# Patient Record
Sex: FEMALE | Race: WHITE | NOT HISPANIC OR LATINO | Employment: FULL TIME | URBAN - METROPOLITAN AREA
[De-identification: names, ages, dates, MRNs, and addresses within clinical notes are randomized per-mention and may not be internally consistent; named-entity substitution may affect disease eponyms.]

---

## 2024-05-01 ENCOUNTER — OFFICE VISIT (OUTPATIENT)
Dept: OTOLARYNGOLOGY | Facility: CLINIC | Age: 29
End: 2024-05-01
Payer: COMMERCIAL

## 2024-05-01 VITALS — TEMPERATURE: 97.3 F | WEIGHT: 293 LBS | BODY MASS INDEX: 44.41 KG/M2 | HEIGHT: 68 IN

## 2024-05-01 DIAGNOSIS — T16.2XXA ACUTE FOREIGN BODY OF LEFT EAR CANAL, INITIAL ENCOUNTER: Primary | ICD-10-CM

## 2024-05-01 DIAGNOSIS — L29.9 EAR ITCH: ICD-10-CM

## 2024-05-01 DIAGNOSIS — L29.9 EAR ITCHING: ICD-10-CM

## 2024-05-01 DIAGNOSIS — L21.9 SEBORRHEIC DERMATITIS, UNSPECIFIED: ICD-10-CM

## 2024-05-01 PROCEDURE — 99214 OFFICE O/P EST MOD 30 MIN: CPT | Performed by: OTOLARYNGOLOGY

## 2024-05-01 PROCEDURE — 1036F TOBACCO NON-USER: CPT | Performed by: OTOLARYNGOLOGY

## 2024-05-01 PROCEDURE — 69200 CLEAR OUTER EAR CANAL: CPT | Performed by: OTOLARYNGOLOGY

## 2024-05-01 RX ORDER — FLUOCINOLONE ACETONIDE 0.11 MG/ML
OIL AURICULAR (OTIC)
Qty: 1 ML | Refills: 11 | Status: SHIPPED | OUTPATIENT
Start: 2024-05-01

## 2024-05-01 NOTE — PROGRESS NOTES
"Chief Complaint   Patient presents with    Follow-up     LOV 7/22 LEFT EAR INF, WAS USING DROPS     HPI:  Dianne Whalen is a 28 y.o. female who is having problems with both ears being very itchy and moist and draining.  Left side also feels plugged up and she was told she may have a foreign body.  She does use Q-tips frequently.  History of left ear canal abscess in 2022.    PMH:  History reviewed. No pertinent past medical history.  Past Surgical History:   Procedure Laterality Date    OTHER SURGICAL HISTORY  07/13/2022    Carpal tunnel surgery    OTHER SURGICAL HISTORY  07/13/2022    Gallbladder surgery         Medications:     Current Outpatient Medications:     fluocinolone (DermOtic) 0.01 % ear drops, 4 drops to the affected ear/s twice daily as needed for itching. 1 bottle. 11 refills., Disp: 1 mL, Rfl: 11     Allergies:  Allergies   Allergen Reactions    Sulfa (Sulfonamide Antibiotics) Hives        ROS:  Review of systems normal unless stated otherwise in the HPI and/or PMH.    Physical Exam:  Temperature 36.3 °C (97.3 °F), height 1.727 m (5' 8\"), weight 133 kg (294 lb). Body mass index is 44.7 kg/m².     GENERAL APPEARANCE: Well developed and well nourished.  Alert and oriented in no acute distress.  Normal vocal quality.      HEAD/FACE: No erythema or edema or facial tenderness.  Normal facial nerve function bilaterally.    EAR:       EXTERNAL: Normal pinnas and external auditory canals without lesion or obstructing wax.  Left ear canal Q-tip foreign body       MIDDLE EAR: Tympanic membranes intact and mobile with normal landmarks.  Middle ear space appears well aerated.       TUBE STATUS: N/A       MASTOID CAVITY: N/A       HEARING: Gross hearing assessment is within normal limits.      NOSE:       VISUALIZED USING: Anterior rhinoscopy with headlight and nasal speculum.       DORSUM: Midline, nontraumatic appearance.       MUCOSA: Normal-appearing.       SECRETIONS: Normal.       SEPTUM: Midline and " nonobstructing.       INFERIOR TURBINATES: Normal.       MIDDLE TURBINATES/MEATUS: N/A       BLEEDING: N/A         ORAL CAVITY/PHARYNX:       TEETH: Adequate dentition.       TONGUE: No mass or lesion.  Normal mobility.       FLOOR OF MOUTH: No mass or lesion.       PALATE: Normal hard palate, soft palate, and uvula.       OROPHARYNX: Normal without mass or lesion.       BUCCAL MUCOSA/GBS: Normal without mass or lesion.       LIPS: Normal.    LARYNX/HYPOPHARYNX/NASOPHARYNX: N/A    NECK: No palpable masses or abnormal adenopathy.  Trachea is midline.    THYROID: No thyromegaly or palpable nodule.    SALIVARY GLANDS: Normal bilateral parotid and submandibular glands by inspection and palpation.    TMJ's: Normal.    NEURO: Cranial nerve exam grossly normal bilaterally.     Procedure:  Left ear canal foreign body was removed using operating microscope and alligator forceps.  No bleeding or complication.  This was clinically a cotton Q-tip    Assessment/Plan   Dianne was seen today for follow-up.  Diagnoses and all orders for this visit:  Acute foreign body of left ear canal, initial encounter (Primary)  Ear itch  Seborrheic dermatitis, unspecified  Ear itching  -     fluocinolone (DermOtic) 0.01 % ear drops; 4 drops to the affected ear/s twice daily as needed for itching. 1 bottle. 11 refills.     Foreign body removed.  Avoid using Q-tips  Will treat her seborrheic dermatitis which is causing the ear itching with dandruff shampoo washes daily as well as DermOtic drops as needed.  Follow up if symptoms worsen or fail to improve.     Nixon Willoughby MD

## 2024-06-13 ENCOUNTER — APPOINTMENT (OUTPATIENT)
Dept: OTOLARYNGOLOGY | Facility: CLINIC | Age: 29
End: 2024-06-13
Payer: COMMERCIAL